# Patient Record
(demographics unavailable — no encounter records)

---

## 2018-11-27 NOTE — RAD
RADIOGRAPH CHEST 2 VIEWS:

 

HISTORY: 

54-year-old female with dyspnea and chest pain.

 

FINDINGS:

There is no air space density, pulmonary edema, pleural effusion, pneumothorax, or cardiomegaly.

 

IMPRESSION: 

No acute cardiopulmonary findings.

 

 

leona 

 

POS: MICAELA
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
## 2019-12-18 NOTE — ULT
THYROID ULTRASOUND:

 

HISTORY:

Thyroid nodule.

 

TECHNIQUE:

Real-time imaging of the right and left lobes of the gland were performed.

 

FINDINGS:

The right lobe measures 1.6 x 1.9 x 4.8 cm. The left lobe measures 1.5 x 1.6 x 5.1 cm. There is a vag
ue hypoechoic 3 mm nodule in the upper pole region of the right lobe. Given this size no further imag
ing would be required.

 

IMPRESSION:

1. Tiny upper pole right lobe thyroid nodule.

 

2. Gland which appears slightly enlarged.

 

POS: TPC

## 2019-12-18 NOTE — MMO
Bilateral MAMMO Bilat Screen [facility].

 

CLINICAL HISTORY:

Patient is 55 years old and is seen for screening.

 

VIEWS:

The views performed were:  .

 

FILMS COMPARED:

The present examination has been compared to prior imaging studies performed at

[facility] on 06/25/2012, 01/07/2015, 11/23/2016 and 09/18/2017.

 

This study has been interpreted with the assistance of computer-aided detection.

 

MAMMOGRAM FINDINGS:

The breasts are heterogeneously dense, which could obscure a lesion on

mammography.

 

There are no suspicious masses, suspicious calcifications, or new areas of

architectural distortion.

 

IMPRESSION:

THERE IS NO MAMMOGRAPHIC EVIDENCE OF MALIGNANCY.

 

A ROUTINE FOLLOW-UP MAMMOGRAM IN 1 YEAR IS RECOMMENDED.

 

THE RESULTS OF THIS EXAM WERE SENT TO THE PATIENT.

 

ACR BI-RADS Category 1 - Negative

 

MAMMOGRAPHY NOTE:

 1. A negative mammogram report should not delay a biopsy if a dominant of

 clinically suspicious mass is present.

 2. Approximately 10% to 15% of breast cancers are not detected by

 mammography.

 3. Adenosis and dense breasts may obscure an underlying neoplasm.

 

 

Reported by: LEONIDES CHI MD

Electonically Signed: [id number]

## 2019-12-18 NOTE — BD
DEXA BONE MINERAL DENSITY STUDY:

 

HISTORY: 

Postmenopausal female.  Osteoporosis screening.

 

COMPARISON: 

None available.

 

FINDINGS: 

 

Lumbar Spine:       BMD (g/cm2)

    L1                0.147              T-Score:  0.5   1.5

    L2                1.158              T-Score:  1.2   2.3

    L3                1.161              T-Score:  0.7   1.9

    L4                1.248              T-Score:  1.7   2.9

 

    L1-L4             1.167              T-Score:  1.0   2.2

 

WHO classification normal.

 

Femoral Neck:         1.012              T-Score:  1.5   2.6

 

Total Femur:          1.218              T-Score:  2.3   3.9

 

Ten-year fracture risk is not reported as all T-scores are at or above -1.0

 

Impression:

 

Normal bone mineral density.

 

POS: JOSE